# Patient Record
Sex: FEMALE | Race: WHITE | Employment: UNEMPLOYED | URBAN - METROPOLITAN AREA
[De-identification: names, ages, dates, MRNs, and addresses within clinical notes are randomized per-mention and may not be internally consistent; named-entity substitution may affect disease eponyms.]

---

## 2019-11-14 ENCOUNTER — OFFICE VISIT (OUTPATIENT)
Dept: CARDIOLOGY CLINIC | Age: 64
End: 2019-11-14

## 2019-11-14 VITALS
WEIGHT: 151.2 LBS | HEIGHT: 63 IN | BODY MASS INDEX: 26.79 KG/M2 | SYSTOLIC BLOOD PRESSURE: 151 MMHG | HEART RATE: 68 BPM | DIASTOLIC BLOOD PRESSURE: 94 MMHG

## 2019-11-14 DIAGNOSIS — E78.00 HYPERCHOLESTEREMIA: ICD-10-CM

## 2019-11-14 DIAGNOSIS — R55 NEAR SYNCOPE: Primary | ICD-10-CM

## 2019-11-14 DIAGNOSIS — R07.9 CHEST PAIN, UNSPECIFIED TYPE: ICD-10-CM

## 2019-11-14 DIAGNOSIS — R03.0 PREHYPERTENSION: ICD-10-CM

## 2019-11-14 RX ORDER — ASPIRIN 81 MG/1
81 TABLET ORAL DAILY
COMMUNITY

## 2019-11-14 RX ORDER — ATORVASTATIN CALCIUM 10 MG/1
10 TABLET, FILM COATED ORAL DAILY
COMMUNITY
End: 2022-09-27 | Stop reason: ALTCHOICE

## 2019-11-14 RX ORDER — GLUCOSAM/CHONDRO/HERB 149/HYAL 750-100 MG
1 TABLET ORAL DAILY
COMMUNITY

## 2019-11-14 RX ORDER — BISMUTH SUBSALICYLATE 262 MG
1 TABLET,CHEWABLE ORAL DAILY
COMMUNITY

## 2019-11-14 NOTE — PATIENT INSTRUCTIONS
There are no discontinued medications. After the recommended changes have been made in blood pressure medicines, patient advised to keep BP/HR(pulse rate) chart twice daily and bring us results in next 1-2 weeks or so. Patient may send the results via \"My Chart\" if desired. Please rest for 5-10 minutes before checking blood pressure       Dizziness: Care Instructions  Your Care Instructions  Dizziness is the feeling of unsteadiness or fuzziness in your head. It is different than having vertigo, which is a feeling that the room is spinning or that you are moving or falling. It is also different from lightheadedness, which is the feeling that you are about to faint. It can be hard to know what causes dizziness. Some people feel dizzy when they have migraine headaches. Sometimes bouts of flu can make you feel dizzy. Some medical conditions, such as heart problems or high blood pressure, can make you feel dizzy. Many medicines can cause dizziness, including medicines for high blood pressure, pain, or anxiety. If a medicine causes your symptoms, your doctor may recommend that you stop or change the medicine. If it is a problem with your heart, you may need medicine to help your heart work better. If there is no clear reason for your symptoms, your doctor may suggest watching and waiting for a while to see if the dizziness goes away on its own. Follow-up care is a key part of your treatment and safety. Be sure to make and go to all appointments, and call your doctor if you are having problems. It's also a good idea to know your test results and keep a list of the medicines you take. How can you care for yourself at home? · If your doctor recommends or prescribes medicine, take it exactly as directed. Call your doctor if you think you are having a problem with your medicine. · Do not drive while you feel dizzy. · Try to prevent falls. Steps you can take include:  ?  Using nonskid mats, adding grab bars near the tub, and using night-lights. ? Clearing your home so that walkways are free of anything you might trip on.  ? Letting family and friends know that you have been feeling dizzy. This will help them know how to help you. When should you call for help? Call 911 anytime you think you may need emergency care. For example, call if:    · You passed out (lost consciousness).     · You have dizziness along with symptoms of a heart attack. These may include:  ? Chest pain or pressure, or a strange feeling in the chest.  ? Sweating. ? Shortness of breath. ? Nausea or vomiting. ? Pain, pressure, or a strange feeling in the back, neck, jaw, or upper belly or in one or both shoulders or arms. ? Lightheadedness or sudden weakness. ? A fast or irregular heartbeat.     · You have symptoms of a stroke. These may include:  ? Sudden numbness, tingling, weakness, or loss of movement in your face, arm, or leg, especially on only one side of your body. ? Sudden vision changes. ? Sudden trouble speaking. ? Sudden confusion or trouble understanding simple statements. ? Sudden problems with walking or balance. ? A sudden, severe headache that is different from past headaches.    Call your doctor now or seek immediate medical care if:    · You feel dizzy and have a fever, headache, or ringing in your ears.     · You have new or increased nausea and vomiting.     · Your dizziness does not go away or comes back.    Watch closely for changes in your health, and be sure to contact your doctor if:    · You do not get better as expected. Where can you learn more? Go to http://cal-caity.info/. Enter G757 in the search box to learn more about \"Dizziness: Care Instructions. \"  Current as of: June 26, 2019  Content Version: 12.2  © 6834-7556 [employer]. Care instructions adapted under license by [employer] (which disclaims liability or warranty for this information).  If you have questions about a medical condition or this instruction, always ask your healthcare professional. [place] [address] any warranty or liability for your use of this information.

## 2019-11-14 NOTE — PROGRESS NOTES
HISTORY OF PRESENT ILLNESS  Willow Levi is a 60 y.o. female. 11/19 Initial episodes in winter 2016. Now recurring since 9/19. Episode starts with feeling bad followed by nausea and then she rests otherwise she thinks she will pass out. No sweating. No incontinence. Occasionally may feel flushed. She quit driving recently. 3-4 years ago approximately when she had these episodes she had blurred vision and at that time mostly episodes happened while driving. History of seizures in 2016 only once without any recurrence. Dizziness   The history is provided by the medical records and patient (see comments). Associated symptoms include chest pain. Pertinent negatives include no headaches and no shortness of breath. Chest Pain (Angina)    The history is provided by the patient. This is a new problem. The current episode started more than 1 week ago. Duration of episode(s) is 2 minutes. The pain is associated with normal activity and walking. The pain is present in the substernal region. The quality of the pain is described as sharp. The pain does not radiate. Associated symptoms include dizziness and palpitations. Pertinent negatives include no claudication, no cough, no diaphoresis, no fever, no headaches, no malaise/fatigue, no nausea, no orthopnea, no PND, no shortness of breath and no vomiting. She has tried nothing for the symptoms. Review of Systems   Constitutional: Negative for chills, diaphoresis, fever, malaise/fatigue and weight loss. HENT: Negative for nosebleeds. Eyes: Negative for discharge. Respiratory: Negative for cough, shortness of breath and wheezing. Cardiovascular: Positive for chest pain and palpitations. Negative for orthopnea, claudication, leg swelling and PND. Gastrointestinal: Negative for diarrhea, nausea and vomiting. Genitourinary: Negative for dysuria and hematuria. Musculoskeletal: Negative for joint pain. Skin: Negative for rash. Neurological: Positive for dizziness. Negative for seizures, loss of consciousness and headaches. Endo/Heme/Allergies: Negative for polydipsia. Does not bruise/bleed easily. Psychiatric/Behavioral: Negative for depression and substance abuse. The patient does not have insomnia. No Known Allergies    Past Medical History:   Diagnosis Date    Hyperlipidemia        History reviewed. No pertinent family history. Social History     Tobacco Use    Smoking status: Never Smoker    Smokeless tobacco: Never Used   Substance Use Topics    Alcohol use: Yes     Alcohol/week: 1.0 standard drinks     Types: 1 Glasses of wine per week     Frequency: Monthly or less     Drinks per session: 1 or 2     Binge frequency: Never    Drug use: Not on file        Current Outpatient Medications   Medication Sig    aspirin delayed-release 81 mg tablet Take 81 mg by mouth daily.  atorvastatin (LIPITOR) 10 mg tablet Take 10 mg by mouth daily.  multivitamin (ONE A DAY) tablet Take 1 Tab by mouth daily.  omega 3-DHA-EPA-fish oil 1,000 mg (120 mg-180 mg) capsule Take 1 Cap by mouth daily. No current facility-administered medications for this visit. History reviewed. No pertinent surgical history. Visit Vitals  BP (!) 143/104 (BP Patient Position: Standing)   Pulse 79   Ht 5' 3\" (1.6 m)   Wt 68.6 kg (151 lb 3.2 oz)   BMI 26.78 kg/m²     Vitals:    11/14/19 1149 11/14/19 1217 11/14/19 1221 11/14/19 1301   BP: 140/90 147/89 (!) 143/104 (!) 151/94   BP Patient Position:  Supine Standing    Pulse: 70 68 79 68   Weight: 68.6 kg (151 lb 3.2 oz)      Height: 5' 3\" (1.6 m)          Diagnostic Studies:  I have reviewed the relevant tests done on the patient and show as follows  EKG tracings reviewed by me today.     EKG Results     Procedure 720 Value Units Date/Time    AMB POC EKG ROUTINE W/ 12 LEADS, INTER & REP [[id number]] Resulted:  11/14/19 1217    Order Status:  Completed Updated:  11/14/19 1217        XR Results (most recent):  No results found for this or any previous visit. No flowsheet data found. Ms. Cuba Late has a reminder for a \"due or due soon\" health maintenance. I have asked that she contact her primary care provider for follow-up on this health maintenance. Physical Exam   Constitutional: She is oriented to person, place, and time. She appears well-developed and well-nourished. No distress. HENT:   Head: Normocephalic and atraumatic. Mouth/Throat: Normal dentition. Eyes: Right eye exhibits no discharge. Left eye exhibits no discharge. No scleral icterus. Neck: Neck supple. No JVD present. Carotid bruit is not present. No thyromegaly present. Cardiovascular: Normal rate, regular rhythm, S1 normal, S2 normal, normal heart sounds and intact distal pulses. Exam reveals no gallop and no friction rub. No murmur heard. Pulmonary/Chest: Effort normal and breath sounds normal. She has no wheezes. She has no rales. Abdominal: Soft. She exhibits no mass. There is no tenderness. Musculoskeletal: She exhibits no edema. Lymphadenopathy:        Right cervical: No superficial cervical adenopathy present. Left cervical: No superficial cervical adenopathy present. Neurological: She is alert and oriented to person, place, and time. Skin: Skin is warm and dry. No rash noted. Psychiatric: She has a normal mood and affect. Her behavior is normal.       ASSESSMENT and PLAN    History of near syncope. Will need work-up done with 2D echo and a tilt table test.  She has normal EKG and excellent functional capacity but considering a history of significant chest pain, will get a stress echo also. Patient had chest pain during the office visit. EKG repeated and no acute ST-T changes seen. Check home BP chart as she likely will need some medications for blood pressure is well. Diagnoses and all orders for this visit:    1.  Near syncope  -     AMB POC EKG ROUTINE W/ 12 LEADS, INTER & REP  -     ECHO ADULT COMPLETE; Future  -     TILT TABLE EVAL W/WO DRUG; Future  -     AMB POC EKG ROUTINE W/ 12 LEADS, INTER & REP    2. Prehypertension  -     [facility]    3. Hypercholesteremia    4. Chest pain, unspecified type  -     ECHO STRESS; Future  -     AMB POC EKG ROUTINE W/ 12 LEADS, INTER & REP        Pertinent laboratory and test data reviewed and discussed with patient. See patient instructions also for other medical advice given    There are no discontinued medications. Follow-up and Dispositions    · Return in about 1 month (around 12/14/2019), or if symptoms worsen or fail to improve, for post test, Get labs from PCP including lipids.

## 2019-11-14 NOTE — H&P (VIEW-ONLY)
HISTORY OF PRESENT ILLNESS Alessandro Deleon is a 60 y.o. female. 11/19 Initial episodes in winter 2016. Now recurring since 9/19. Episode starts with feeling bad followed by nausea and then she rests otherwise she thinks she will pass out. No sweating. No incontinence. Occasionally may feel flushed. She quit driving recently. 3-4 years ago approximately when she had these episodes she had blurred vision and at that time mostly episodes happened while driving. History of seizures in 2016 only once without any recurrence. Dizziness The history is provided by the medical records and patient (see comments). Associated symptoms include chest pain. Pertinent negatives include no headaches and no shortness of breath. Chest Pain (Angina) The history is provided by the patient. This is a new problem. The current episode started more than 1 week ago. Duration of episode(s) is 2 minutes. The pain is associated with normal activity and walking. The pain is present in the substernal region. The quality of the pain is described as sharp. The pain does not radiate. Associated symptoms include dizziness and palpitations. Pertinent negatives include no claudication, no cough, no diaphoresis, no fever, no headaches, no malaise/fatigue, no nausea, no orthopnea, no PND, no shortness of breath and no vomiting. She has tried nothing for the symptoms. Review of Systems Constitutional: Negative for chills, diaphoresis, fever, malaise/fatigue and weight loss. HENT: Negative for nosebleeds. Eyes: Negative for discharge. Respiratory: Negative for cough, shortness of breath and wheezing. Cardiovascular: Positive for chest pain and palpitations. Negative for orthopnea, claudication, leg swelling and PND. Gastrointestinal: Negative for diarrhea, nausea and vomiting. Genitourinary: Negative for dysuria and hematuria. Musculoskeletal: Negative for joint pain. Skin: Negative for rash. Neurological: Positive for dizziness. Negative for seizures, loss of consciousness and headaches. Endo/Heme/Allergies: Negative for polydipsia. Does not bruise/bleed easily. Psychiatric/Behavioral: Negative for depression and substance abuse. The patient does not have insomnia. No Known Allergies Past Medical History:  
Diagnosis Date  Hyperlipidemia History reviewed. No pertinent family history. Social History Tobacco Use  Smoking status: Never Smoker  Smokeless tobacco: Never Used Substance Use Topics  Alcohol use: Yes Alcohol/week: 1.0 standard drinks Types: 1 Glasses of wine per week Frequency: Monthly or less Drinks per session: 1 or 2 Binge frequency: Never  Drug use: Not on file Current Outpatient Medications Medication Sig  
 aspirin delayed-release 81 mg tablet Take 81 mg by mouth daily.  atorvastatin (LIPITOR) 10 mg tablet Take 10 mg by mouth daily.  multivitamin (ONE A DAY) tablet Take 1 Tab by mouth daily.  omega 3-DHA-EPA-fish oil 1,000 mg (120 mg-180 mg) capsule Take 1 Cap by mouth daily. No current facility-administered medications for this visit. History reviewed. No pertinent surgical history. Visit Vitals BP (!) 143/104 (BP Patient Position: Standing) Pulse 79 Ht 5' 3\" (1.6 m) Wt 68.6 kg (151 lb 3.2 oz) BMI 26.78 kg/m² Vitals:  
 11/14/19 1149 11/14/19 1217 11/14/19 1221 11/14/19 1301 BP: 140/90 147/89 (!) 143/104 (!) 151/94 BP Patient Position:  Supine Standing Pulse: 70 68 79 68 Weight: 68.6 kg (151 lb 3.2 oz) Height: 5' 3\" (1.6 m) Diagnostic Studies: 
I have reviewed the relevant tests done on the patient and show as follows EKG tracings reviewed by me today. EKG Results Procedure 720 Value Units Date/Time AMB POC EKG ROUTINE W/ 12 LEADS, INTER & REP [[id number]] Resulted:  11/14/19 1217 Order Status:  Completed Updated:  11/14/19 1217 XR Results (most recent): No results found for this or any previous visit. No flowsheet data found. Ms. Betsy Whipple has a reminder for a \"due or due soon\" health maintenance. I have asked that she contact her primary care provider for follow-up on this health maintenance. Physical Exam  
Constitutional: She is oriented to person, place, and time. She appears well-developed and well-nourished. No distress. HENT:  
Head: Normocephalic and atraumatic. Mouth/Throat: Normal dentition. Eyes: Right eye exhibits no discharge. Left eye exhibits no discharge. No scleral icterus. Neck: Neck supple. No JVD present. Carotid bruit is not present. No thyromegaly present. Cardiovascular: Normal rate, regular rhythm, S1 normal, S2 normal, normal heart sounds and intact distal pulses. Exam reveals no gallop and no friction rub. No murmur heard. Pulmonary/Chest: Effort normal and breath sounds normal. She has no wheezes. She has no rales. Abdominal: Soft. She exhibits no mass. There is no tenderness. Musculoskeletal: She exhibits no edema. Lymphadenopathy:  
     Right cervical: No superficial cervical adenopathy present. Left cervical: No superficial cervical adenopathy present. Neurological: She is alert and oriented to person, place, and time. Skin: Skin is warm and dry. No rash noted. Psychiatric: She has a normal mood and affect. Her behavior is normal.  
 
 
ASSESSMENT and PLAN History of near syncope. Will need work-up done with 2D echo and a tilt table test. 
She has normal EKG and excellent functional capacity but considering a history of significant chest pain, will get a stress echo also. Patient had chest pain during the office visit. EKG repeated and no acute ST-T changes seen. Check home BP chart as she likely will need some medications for blood pressure is well. Diagnoses and all orders for this visit: 1. Near syncope -     AMB POC EKG ROUTINE W/ 12 LEADS, INTER & REP 
-     ECHO ADULT COMPLETE; Future -     TILT TABLE EVAL W/WO DRUG; Future -     AMB POC EKG ROUTINE W/ 12 LEADS, INTER & REP 2. Prehypertension 
-     [facility] 3. Hypercholesteremia 4. Chest pain, unspecified type 
-     ECHO STRESS; Future -     AMB POC EKG ROUTINE W/ 12 LEADS, INTER & REP Pertinent laboratory and test data reviewed and discussed with patient. See patient instructions also for other medical advice given There are no discontinued medications. Follow-up and Dispositions · Return in about 1 month (around 12/14/2019), or if symptoms worsen or fail to improve, for post test, Get labs from PCP including lipids.

## 2019-11-19 ENCOUNTER — HOSPITAL ENCOUNTER (OUTPATIENT)
Dept: CARDIAC CATH/INVASIVE PROCEDURES | Age: 64
Discharge: HOME | End: 2019-11-19
Attending: INTERNAL MEDICINE | Admitting: INTERNAL MEDICINE
Payer: MEDICAID

## 2019-11-19 VITALS
HEIGHT: 63 IN | DIASTOLIC BLOOD PRESSURE: 73 MMHG | SYSTOLIC BLOOD PRESSURE: 130 MMHG | OXYGEN SATURATION: 100 % | RESPIRATION RATE: 16 BRPM | HEART RATE: 66 BPM | TEMPERATURE: 98.7 F | BODY MASS INDEX: 26.75 KG/M2 | WEIGHT: 151 LBS

## 2019-11-19 DIAGNOSIS — R55 NEAR SYNCOPE: ICD-10-CM

## 2019-11-19 PROCEDURE — 93660 TILT TABLE EVALUATION: CPT

## 2019-11-19 PROCEDURE — 74011250636 HC RX REV CODE- 250/636: Performed by: INTERNAL MEDICINE

## 2019-11-19 RX ORDER — SODIUM CHLORIDE 9 MG/ML
20 INJECTION, SOLUTION INTRAVENOUS CONTINUOUS
Status: DISCONTINUED | OUTPATIENT
Start: 2019-11-19 | End: 2019-11-19 | Stop reason: HOSPADM

## 2019-11-19 RX ADMIN — SODIUM CHLORIDE 20 ML/HR: 900 INJECTION, SOLUTION INTRAVENOUS at 12:38

## 2019-11-19 NOTE — PROGRESS NOTES
TRANSFER - OUT REPORT:    Verbal report given to AHMET Nielsen(name) on Foreman Si being transferred to [facility](unit) for routine progression of care       Report consisted of patient's Situation, Background, Assessment and   Recommendations(SBAR). Information from the following report(s) SBAR was reviewed with the receiving nurse. Opportunity for questions and clarification was provided.       Patient transported with:   [employer]

## 2019-11-19 NOTE — PERIOP NOTES
Pre-Op Summary    Pt arrived via car with family/friend and is oriented to time, place, person and situation. Patient with steady gait with none assistive devices. Visit Vitals  /72 (BP 1 Location: Left arm, BP Patient Position: At rest)   Pulse 70   Temp 98.7 °F (37.1 °C)   Resp 16   Ht 5' 3\" (1.6 m)   Wt 68.5 kg (151 lb)   SpO2 100%   BMI 26.75 kg/m²       Peripheral IV located on Left hand . Patients belongings are located with . Patient's point of contact is Sveta Aguayo  and their contact number is: [phone]. They will be in the waiting room. They are able to receive medication information. They will be their ride home.

## 2019-11-19 NOTE — INTERVAL H&P NOTE
H&P Update:  Jose Farfan was seen and examined. History and physical has been reviewed. Significant clinical changes have occurred as noted: Patient has eaten a full breakfast and the test will need to be rescheduled.

## 2019-11-19 NOTE — PERIOP NOTES
Pt reports no dizziness. Walked from stretcher to recliner with a steady gait. D/C instructions given .

## 2019-11-19 NOTE — PROGRESS NOTES
[address] [facility]  [address] [facility]       [phone]            Tilt TABLE REPORT       Number [id number]           Page 1 of 1  Patient Name: Ciara Meyer                                                                           Date: 2019  Room No: OP   Pt history/ Indications for test: Dizziness    MEDICATIONS: None       Vital Signs  TIME POSITION HR/RHYTHM BP  O2SAT %    1257 SUPINE 67//79 97   1302 SUPINE 65//77 98   1307 SUPINE 68//77 97   1312 60° HUT 74//84 96   1317 60° HUT 72//83 96   1322 60° HUT 86//81 95   1327 60° HUT 79//81 95   1332 60° HUT 76//78 97   1337 60° HUT 76//71 95   1342 60° HUT 80//82 96   1347 60° HUT 83//73 97   1352 SUPINE 68//71 95   1357 SUPINE 66//73 94                          Symptoms: 1322 patient stated she felt slightly nauseated. 1337 Patient states she is still feeling nauseous and has a slight headache. 1352 Patient stated that she no longer feels nauseated. I.V.  Fluids: Robb@Mdundo    Medications:      Complications:      Conclusion:        Staff Signature: Moraima Chicas MD Signature:_______________________________________ Carmen OLGUIN [phone]   1955

## 2019-11-19 NOTE — Clinical Note
Allergies reviewed, pre-procedure education provided, patient verbalized understanding of procedure, procedural consent signed and patient is NPO.

## 2019-12-18 NOTE — PROCEDURES
1.  Tilt table exam demonstrating no significant fall in blood pressure or heart rate. 2.  No loss of consciousness or inability to maintain posture. 3.  No evidence for progressive orthostatic hypotension    4.   No sustained heart rate of greater than 30 bpm or increased to 120 bpm within the first 10 minutes of the passive phase which would be diagnostic for POTs

## 2020-01-24 ENCOUNTER — OFFICE VISIT (OUTPATIENT)
Dept: CARDIOLOGY CLINIC | Age: 65
End: 2020-01-24

## 2020-01-24 VITALS
HEART RATE: 78 BPM | WEIGHT: 156 LBS | BODY MASS INDEX: 27.64 KG/M2 | SYSTOLIC BLOOD PRESSURE: 127 MMHG | HEIGHT: 63 IN | DIASTOLIC BLOOD PRESSURE: 72 MMHG

## 2020-01-24 DIAGNOSIS — R55 NEAR SYNCOPE: Primary | ICD-10-CM

## 2020-01-24 DIAGNOSIS — E78.00 HYPERCHOLESTEREMIA: ICD-10-CM

## 2020-01-24 DIAGNOSIS — R07.9 CHEST PAIN, UNSPECIFIED TYPE: ICD-10-CM

## 2020-01-24 NOTE — PATIENT INSTRUCTIONS
There are no discontinued medications. Orthostatic Hypotension: Care Instructions  Your Care Instructions    Orthostatic hypotension is a quick drop in blood pressure. It happens when you get up from sitting or lying down. You may feel faint, lightheaded, or dizzy. When a person sits up or stands up, the body changes the way it pumps blood. This can slow the flow of blood to the brain for a very short time. And that can make you feel lightheaded. Many medicines can cause this problem, especially in older people. Lack of fluids (dehydration) or illnesses such as diabetes or heart disease also can cause it. Follow-up care is a key part of your treatment and safety. Be sure to make and go to all appointments, and call your doctor if you are having problems. It's also a good idea to know your test results and keep a list of the medicines you take. How can you care for yourself at home? · Tell your doctor about any problems you have with your medicines. · If your doctor prescribes medicine to help prevent a low blood pressure problem, take it exactly as prescribed. Call your doctor if you think you are having a problem with your medicine. · Drink plenty of fluids, enough so that your urine is light yellow or clear like water. Choose water and other caffeine-free clear liquids. If you have kidney, heart, or liver disease and have to limit fluids, talk with your doctor before you increase the amount of fluids you drink. · Limit or avoid alcohol and caffeine. · Get up slowly from bed or after sitting for a long time. If you are in bed, roll to your side and swing your legs over the edge of the bed and onto the floor. Push your body up to a sitting position. Wait for a while before you slowly stand up. If you are dizzy or lightheaded, sit or lie down. When should you call for help? Call 911 anytime you think you may need emergency care. For example, call if:    · You passed out (lost consciousness).  Watch closely for changes in your health, and be sure to contact your doctor if:    · You do not get better as expected. Where can you learn more? Go to http://cal-caity.info/. Enter E294 in the search box to learn more about \"Orthostatic Hypotension: Care Instructions. \"  Current as of: 2019  Content Version: 12.2  © 6997-7161 [employer]. Care instructions adapted under license by [employer] (which disclaims liability or warranty for this information). If you have questions about a medical condition or this instruction, always ask your healthcare professional. [address] any warranty or liability for your use of this information. MyActivityPal Activation    Thank you for requesting access to MyActivityPal. Please follow the instructions below to securely access and download your online medical record. MyActivityPal allows you to send messages to your doctor, view your test results, renew your prescriptions, schedule appointments, and more. How Do I Sign Up? 1. In your internet browser, go to [email or web address]. 2. Click on the First Time User? Click Here link in the Sign In box. You will see the New Member Sign Up page. 3. Enter your MyActivityPal Access Code exactly as it appears below. You will not need to use this code after youve completed the sign-up process. If you do not sign up before the expiration date, you must request a new code. MyActivityPal Access Code: [email or web address]  Expires: 3/9/2020 11:22 AM (This is the date your MyActivityPal access code will )    4. Enter the last four digits of your Social Security Number (xxxx) and Date of Birth (mm/dd/yyyy) as indicated and click Submit. You will be taken to the next sign-up page. 5. Create a MyActivityPal ID. This will be your MyActivityPal login ID and cannot be changed, so think of one that is secure and easy to remember. 6. Create a MyActivityPal password.  You can change your password at any time. 7. Enter your Password Reset Question and Answer. This can be used at a later time if you forget your password. 8. Enter your e-mail address. You will receive e-mail notification when new information is available in [address] 9. Click Sign Up. You can now view and download portions of your medical record. 10. Click the Download Summary menu link to download a portable copy of your medical information. Additional Information    If you have questions, please visit the Frequently Asked Questions section of the [employer] website at [email or web address] Remember, [employer] is NOT to be used for urgent needs. For medical emergencies, dial 911.

## 2020-01-24 NOTE — PROGRESS NOTES
1. Have you been to the ER, urgent care clinic since your last visit? Hospitalized since your last visit?  no       2. Have you seen or consulted any other health care providers outside of the [address] [place] [address] since your last visit? Include any pap smears or colon screening. No     3. Since your last visit, have you had any of the following symptoms?      dizziness. 4.  Have you had any blood work, X-rays or cardiac testing? No         5. Where do you normally have your labs drawn? 6. Do you need any refills? no

## 2020-01-24 NOTE — PROGRESS NOTES
HISTORY OF PRESENT ILLNESS  Alba Westbrook is a 60 y.o. female. 11/19 Initial episodes in winter 2016. Now recurring since 9/19. Episode starts with feeling bad followed by nausea and then she rests otherwise she thinks she will pass out. No sweating. No incontinence. Occasionally may feel flushed. She quit driving recently. 3-4 years ago approximately when she had these episodes she had blurred vision and at that time mostly episodes happened while driving. History of seizures in 2016 only once without any recurrence. Dizziness   The history is provided by the medical records and patient (see comments). This is a recurrent problem. The current episode started more than 1 week ago. The problem occurs daily. The problem has not changed since onset. Associated symptoms include chest pain. Pertinent negatives include no headaches and no shortness of breath. Chest Pain (Angina)    The history is provided by the patient. This is a new problem. The current episode started more than 1 week ago. Duration of episode(s) is 2 minutes. The problem occurs every several days (3-4/wk). The pain is associated with normal activity and walking. The pain is present in the substernal region. The quality of the pain is described as sharp. The pain does not radiate. Associated symptoms include dizziness and palpitations. Pertinent negatives include no claudication, no cough, no diaphoresis, no fever, no headaches, no malaise/fatigue, no nausea, no orthopnea, no PND, no shortness of breath and no vomiting. She has tried nothing for the symptoms. Cholesterol Problem   Associated symptoms include chest pain. Pertinent negatives include no headaches and no shortness of breath. Review of Systems   Constitutional: Negative for chills, diaphoresis, fever, malaise/fatigue and weight loss. HENT: Negative for nosebleeds. Eyes: Negative for discharge.    Respiratory: Negative for cough, shortness of breath and wheezing. Cardiovascular: Positive for chest pain and palpitations. Negative for orthopnea, claudication, leg swelling and PND. Gastrointestinal: Negative for diarrhea, nausea and vomiting. Genitourinary: Negative for dysuria and hematuria. Musculoskeletal: Negative for joint pain. Skin: Negative for rash. Neurological: Positive for dizziness. Negative for seizures, loss of consciousness and headaches. Endo/Heme/Allergies: Negative for polydipsia. Does not bruise/bleed easily. Psychiatric/Behavioral: Negative for depression and substance abuse. The patient does not have insomnia. Allergies   Allergen Reactions    Benzoyl Peroxide Other (comments)     Eyes swell       Past Medical History:   Diagnosis Date    Hyperlipidemia        History reviewed. No pertinent family history. Social History     Tobacco Use    Smoking status: Never Smoker    Smokeless tobacco: Never Used   Substance Use Topics    Alcohol use: Yes     Alcohol/week: 1.0 standard drinks     Types: 1 Glasses of wine per week     Frequency: Monthly or less     Drinks per session: 1 or 2     Binge frequency: Never     Comment: occ.  Drug use: Never        Current Outpatient Medications   Medication Sig    aspirin delayed-release 81 mg tablet Take 81 mg by mouth daily.  atorvastatin (LIPITOR) 10 mg tablet Take 10 mg by mouth daily.  multivitamin (ONE A DAY) tablet Take 1 Tab by mouth daily.  omega 3-DHA-EPA-fish oil 1,000 mg (120 mg-180 mg) capsule Take 1 Cap by mouth daily. No current facility-administered medications for this visit. History reviewed. No pertinent surgical history.     Visit Vitals  /72   Pulse 78   Ht 5' 3\" (1.6 m)   Wt 70.8 kg (156 lb)   BMI 27.63 kg/m²     Vitals:    01/24/20 1054   BP: 127/72   Pulse: 78   Weight: 70.8 kg (156 lb)   Height: 5' 3\" (1.6 m)       Diagnostic Studies:  I have reviewed the relevant tests done on the patient and show as follows  EKG tracings reviewed by me today. EKG Results     None        XR Results (most recent):  No results found for this or any previous visit. 11/19 tilt table test  1. Tilt table exam demonstrating no significant fall in blood pressure or heart rate.     2. No loss of consciousness or inability to maintain posture.     3. No evidence for progressive orthostatic hypotension     4. No sustained heart rate of greater than 30 bpm or increased to 120 bpm within the first 10 minutes of the passive phase which would be diagnostic for POTs  12/12/19   ECHO STRESS 12/13/2019 12/16/2019    Narrative · Baseline ECG: Normal sinus rhythm. · Normal stress echocardiogram. Low risk study. · Low risk [facility] treadmill score. · Negative stress test.        Signed by: Tod Funk MD   12/19 echo  Interpretation Summary        · Left Ventricle: Normal cavity size, wall thickness and systolic function (ejection fraction normal). Estimated left ventricular ejection fraction is 56 - 60%. No regional wall motion abnormality noted. Mild (grade 1) left ventricular diastolic dysfunction. · Right Ventricle: Normal right ventricular size and function. · Mitral Valve: Trace mitral valve regurgitation is present. · Tricuspid Valve: Mild tricuspid valve regurgitation is present. · Pulmonary Artery: There is no evidence of pulmonary hypertension. Ms. Antonio Nguyen has a reminder for a \"due or due soon\" health maintenance. I have asked that she contact her primary care provider for follow-up on this health maintenance. Physical Exam   Constitutional: She is oriented to person, place, and time. She appears well-developed and well-nourished. No distress. HENT:   Head: Normocephalic and atraumatic. Mouth/Throat: Normal dentition. Eyes: Right eye exhibits no discharge. Left eye exhibits no discharge. No scleral icterus. Neck: Neck supple. No JVD present. Carotid bruit is not present. No thyromegaly present.    Cardiovascular: Normal rate, regular rhythm, S1 normal, S2 normal, normal heart sounds and intact distal pulses. Exam reveals no gallop and no friction rub. No murmur heard. Pulmonary/Chest: Effort normal and breath sounds normal. She has no wheezes. She has no rales. Abdominal: Soft. She exhibits no mass. There is no tenderness. Musculoskeletal:         General: No edema. Lymphadenopathy:        Right cervical: No superficial cervical adenopathy present. Left cervical: No superficial cervical adenopathy present. Neurological: She is alert and oriented to person, place, and time. Skin: Skin is warm and dry. No rash noted. Psychiatric: She has a normal mood and affect. Her behavior is normal.       ASSESSMENT and PLAN    Echo, stress echo and tilt table test are negative. During the tilt table, systolic pressure dropped by 30 points after about 20 minutes. Patient describes symptoms as soon as she was tilted upright which would not correlate with the drop in blood pressure. I do not think anything cardiac is going on with Ms. Payton Dandy but will follow her one more time.  describes change in mental status which may be due to seizures and I would leave that work-up and treatment to Dr. Darrell Schmitt. Diagnoses and all orders for this visit:    1. Near syncope    2. Chest pain, unspecified type    3. Hypercholesteremia        Pertinent laboratory and test data reviewed and discussed with patient. See patient instructions also for other medical advice given    There are no discontinued medications. Follow-up and Dispositions    · Return in about 6 months (around 7/24/2020), or if symptoms worsen or fail to improve.

## 2020-08-20 ENCOUNTER — OFFICE (OUTPATIENT)
Dept: URBAN - METROPOLITAN AREA CLINIC 14 | Facility: CLINIC | Age: 65
End: 2020-08-20

## 2020-08-20 VITALS
OXYGEN SATURATION: 90 % | HEIGHT: 67 IN | HEART RATE: 69 BPM | SYSTOLIC BLOOD PRESSURE: 136 MMHG | DIASTOLIC BLOOD PRESSURE: 75 MMHG | RESPIRATION RATE: 18 BRPM | WEIGHT: 293 LBS

## 2020-08-20 DIAGNOSIS — R93.5 ABNORMAL FINDINGS ON DIAGNOSTIC IMAGING OF OTHER ABDOMINAL R: ICD-10-CM

## 2020-08-20 PROCEDURE — 99204 OFFICE O/P NEW MOD 45 MIN: CPT | Performed by: NURSE PRACTITIONER

## 2020-08-20 RX ORDER — POLYETHYLENE GLYCOL 3350, SODIUM SULFATE, SODIUM CHLORIDE, POTASSIUM CHLORIDE, ASCORBIC ACID, SODIUM ASCORBATE 140-9-5.2G
KIT ORAL
Qty: 1 | Refills: 0 | Status: COMPLETED
Start: 2020-08-20 | End: 2020-09-01

## 2020-08-20 NOTE — SERVICEHPINOTES
Gaby Anand   is a   65   year old  female   here today at the request Dr. Urbina for evaluation of abnormal upper GI prior to bariatric surgery.  The patient plans to have a gastric bypass and as part of her preoperative evaluation her upper GI revealed a small hiatal hernia, reflux with some delayed clearance and possible nonspecific motility.  She has occasional heartburn after eating certain foods but denies reflux and does not require medications for this.  She denies dysphagia.  She has regular stools without recent change in her bowel habits, melena or hematochezia. There is no family history of colon cancer or polyps.  Her last colonoscopy was greater than 10 years ago.

## 2020-09-01 ENCOUNTER — OFFICE (OUTPATIENT)
Dept: URBAN - METROPOLITAN AREA PATHOLOGY 22 | Facility: PATHOLOGY | Age: 65
End: 2020-09-01

## 2020-09-01 ENCOUNTER — AMBULATORY SURGICAL CENTER (OUTPATIENT)
Dept: URBAN - METROPOLITAN AREA SURGERY 2 | Facility: SURGERY | Age: 65
End: 2020-09-01
Payer: MEDICARE

## 2020-09-01 ENCOUNTER — AMBULATORY SURGICAL CENTER (OUTPATIENT)
Dept: URBAN - METROPOLITAN AREA SURGERY 2 | Facility: SURGERY | Age: 65
End: 2020-09-01

## 2020-09-01 VITALS
OXYGEN SATURATION: 94 % | TEMPERATURE: 97.5 F | HEIGHT: 67 IN | SYSTOLIC BLOOD PRESSURE: 192 MMHG | OXYGEN SATURATION: 94 % | DIASTOLIC BLOOD PRESSURE: 62 MMHG | DIASTOLIC BLOOD PRESSURE: 60 MMHG | HEART RATE: 69 BPM | TEMPERATURE: 98 F | SYSTOLIC BLOOD PRESSURE: 116 MMHG | DIASTOLIC BLOOD PRESSURE: 60 MMHG | SYSTOLIC BLOOD PRESSURE: 134 MMHG | HEART RATE: 76 BPM | DIASTOLIC BLOOD PRESSURE: 96 MMHG | TEMPERATURE: 97.5 F | SYSTOLIC BLOOD PRESSURE: 126 MMHG | DIASTOLIC BLOOD PRESSURE: 96 MMHG | HEART RATE: 78 BPM | HEART RATE: 78 BPM | OXYGEN SATURATION: 100 % | DIASTOLIC BLOOD PRESSURE: 67 MMHG | DIASTOLIC BLOOD PRESSURE: 67 MMHG | RESPIRATION RATE: 16 BRPM | HEART RATE: 74 BPM | RESPIRATION RATE: 16 BRPM | HEART RATE: 69 BPM | DIASTOLIC BLOOD PRESSURE: 71 MMHG | SYSTOLIC BLOOD PRESSURE: 134 MMHG | HEART RATE: 72 BPM | DIASTOLIC BLOOD PRESSURE: 71 MMHG | TEMPERATURE: 98 F | HEART RATE: 72 BPM | HEART RATE: 74 BPM | DIASTOLIC BLOOD PRESSURE: 62 MMHG | SYSTOLIC BLOOD PRESSURE: 126 MMHG | TEMPERATURE: 98 F | SYSTOLIC BLOOD PRESSURE: 126 MMHG | HEART RATE: 76 BPM | HEART RATE: 74 BPM | DIASTOLIC BLOOD PRESSURE: 67 MMHG | SYSTOLIC BLOOD PRESSURE: 192 MMHG | HEIGHT: 67 IN | SYSTOLIC BLOOD PRESSURE: 140 MMHG | DIASTOLIC BLOOD PRESSURE: 71 MMHG | SYSTOLIC BLOOD PRESSURE: 116 MMHG | OXYGEN SATURATION: 100 % | HEART RATE: 72 BPM | SYSTOLIC BLOOD PRESSURE: 134 MMHG | SYSTOLIC BLOOD PRESSURE: 116 MMHG | SYSTOLIC BLOOD PRESSURE: 140 MMHG | SYSTOLIC BLOOD PRESSURE: 140 MMHG | HEIGHT: 67 IN | HEART RATE: 76 BPM | OXYGEN SATURATION: 94 % | RESPIRATION RATE: 16 BRPM | OXYGEN SATURATION: 100 % | DIASTOLIC BLOOD PRESSURE: 60 MMHG | SYSTOLIC BLOOD PRESSURE: 192 MMHG | HEART RATE: 69 BPM | HEART RATE: 78 BPM | TEMPERATURE: 97.5 F | DIASTOLIC BLOOD PRESSURE: 62 MMHG | DIASTOLIC BLOOD PRESSURE: 96 MMHG

## 2020-09-01 DIAGNOSIS — K31.89 OTHER DISEASES OF STOMACH AND DUODENUM: ICD-10-CM

## 2020-09-01 DIAGNOSIS — K44.9 DIAPHRAGMATIC HERNIA WITHOUT OBSTRUCTION OR GANGRENE: ICD-10-CM

## 2020-09-01 DIAGNOSIS — Z12.11 ENCOUNTER FOR SCREENING FOR MALIGNANT NEOPLASM OF COLON: ICD-10-CM

## 2020-09-01 PROCEDURE — G8918 PT W/O PREOP ORDER IV AB PRO: HCPCS | Performed by: INTERNAL MEDICINE

## 2020-09-01 PROCEDURE — 88342 IMHCHEM/IMCYTCHM 1ST ANTB: CPT | Performed by: INTERNAL MEDICINE

## 2020-09-01 PROCEDURE — 88313 SPECIAL STAINS GROUP 2: CPT | Performed by: INTERNAL MEDICINE

## 2020-09-01 PROCEDURE — 43239 EGD BIOPSY SINGLE/MULTIPLE: CPT | Mod: 51 | Performed by: INTERNAL MEDICINE

## 2020-09-01 PROCEDURE — G0121 COLON CA SCRN NOT HI RSK IND: HCPCS | Performed by: INTERNAL MEDICINE

## 2020-09-01 PROCEDURE — G8907 PT DOC NO EVENTS ON DISCHARG: HCPCS | Performed by: INTERNAL MEDICINE

## 2020-09-01 PROCEDURE — 88305 TISSUE EXAM BY PATHOLOGIST: CPT | Performed by: INTERNAL MEDICINE

## 2022-03-18 PROBLEM — E78.00 HYPERCHOLESTEREMIA: Status: ACTIVE | Noted: 2019-11-14

## 2022-03-19 PROBLEM — R07.9 CHEST PAIN: Status: ACTIVE | Noted: 2019-11-14

## 2022-03-19 PROBLEM — R55 NEAR SYNCOPE: Status: ACTIVE | Noted: 2019-11-14

## 2022-03-19 PROBLEM — R03.0 PREHYPERTENSION: Status: ACTIVE | Noted: 2019-11-14

## 2022-05-26 ENCOUNTER — TRANSCRIBE ORDER (OUTPATIENT)
Dept: REGISTRATION | Age: 67
End: 2022-05-26

## 2022-05-26 ENCOUNTER — HOSPITAL ENCOUNTER (OUTPATIENT)
Dept: GENERAL RADIOLOGY | Age: 67
Discharge: HOME | End: 2022-05-26
Payer: MEDICARE

## 2022-05-26 DIAGNOSIS — M25.512 LEFT SHOULDER PAIN: ICD-10-CM

## 2022-05-26 DIAGNOSIS — M25.512 LEFT SHOULDER PAIN: Primary | ICD-10-CM

## 2022-05-26 PROCEDURE — 73030 X-RAY EXAM OF SHOULDER: CPT

## 2022-08-11 ENCOUNTER — TRANSCRIBE ORDER (OUTPATIENT)
Dept: SCHEDULING | Age: 67
End: 2022-08-11

## 2022-08-11 DIAGNOSIS — M25.512 LEFT SHOULDER PAIN: Primary | ICD-10-CM

## 2022-09-13 ENCOUNTER — HOSPITAL ENCOUNTER (OUTPATIENT)
Dept: MRI IMAGING | Age: 67
Discharge: HOME | End: 2022-09-13
Payer: MEDICARE

## 2022-09-13 DIAGNOSIS — M25.512 LEFT SHOULDER PAIN: ICD-10-CM

## 2022-09-13 PROCEDURE — 73221 MRI JOINT UPR EXTREM W/O DYE: CPT

## 2022-09-27 ENCOUNTER — OFFICE VISIT (OUTPATIENT)
Dept: ORTHOPEDIC SURGERY | Age: 67
End: 2022-09-27
Payer: MEDICARE

## 2022-09-27 VITALS — HEIGHT: 62 IN | WEIGHT: 153 LBS | BODY MASS INDEX: 28.16 KG/M2

## 2022-09-27 DIAGNOSIS — M75.122 COMPLETE TEAR OF LEFT ROTATOR CUFF, UNSPECIFIED WHETHER TRAUMATIC: ICD-10-CM

## 2022-09-27 DIAGNOSIS — M25.512 LEFT SHOULDER PAIN, UNSPECIFIED CHRONICITY: Primary | ICD-10-CM

## 2022-09-27 DIAGNOSIS — M25.512 LEFT SHOULDER PAIN, UNSPECIFIED CHRONICITY: ICD-10-CM

## 2022-09-27 PROCEDURE — 1090F PRES/ABSN URINE INCON ASSESS: CPT | Performed by: ORTHOPAEDIC SURGERY

## 2022-09-27 PROCEDURE — G8417 CALC BMI ABV UP PARAM F/U: HCPCS | Performed by: ORTHOPAEDIC SURGERY

## 2022-09-27 PROCEDURE — 1101F PT FALLS ASSESS-DOCD LE1/YR: CPT | Performed by: ORTHOPAEDIC SURGERY

## 2022-09-27 PROCEDURE — 1123F ACP DISCUSS/DSCN MKR DOCD: CPT | Performed by: ORTHOPAEDIC SURGERY

## 2022-09-27 PROCEDURE — G8400 PT W/DXA NO RESULTS DOC: HCPCS | Performed by: ORTHOPAEDIC SURGERY

## 2022-09-27 PROCEDURE — G8432 DEP SCR NOT DOC, RNG: HCPCS | Performed by: ORTHOPAEDIC SURGERY

## 2022-09-27 PROCEDURE — G8427 DOCREV CUR MEDS BY ELIG CLIN: HCPCS | Performed by: ORTHOPAEDIC SURGERY

## 2022-09-27 PROCEDURE — G8536 NO DOC ELDER MAL SCRN: HCPCS | Performed by: ORTHOPAEDIC SURGERY

## 2022-09-27 PROCEDURE — 3017F COLORECTAL CA SCREEN DOC REV: CPT | Performed by: ORTHOPAEDIC SURGERY

## 2022-09-27 PROCEDURE — 99204 OFFICE O/P NEW MOD 45 MIN: CPT | Performed by: ORTHOPAEDIC SURGERY

## 2022-09-27 NOTE — PATIENT INSTRUCTIONS
Rotator Cuff Injury: Care Instructions  Overview     The rotator cuff is a group of tendons and muscles around the shoulder that keeps the upper arm bone in place. It keeps the shoulder joint stable and allows you to raise and rotate your arm. Damage to the rotator cuff can be caused by overuse, a fall, or a direct blow to the shoulder area, which can tear the tendons. Over time, everyday wear can damage the tendons and make injury more likely. Treatment can depend on the type and amount of damage to the tendons. Your doctor may have you try physical therapy and exercise first. If physical therapy does not help, your doctor may recommend surgery. Follow-up care is a key part of your treatment and safety. Be sure to make and go to all appointments, and call your doctor if you are having problems. It's also a good idea to know your test results and keep a list of the medicines you take. How can you care for yourself at home? Rest your shoulder as much as you can. If your doctor put your arm in a sling or shoulder immobilizer, wear it as directed. Do not take it off before your doctor tells you to. If it is too tight, loosen it. Be safe with medicines. Read and follow all instructions on the label. If the doctor gave you a prescription medicine for pain, take it as prescribed. If you are not taking a prescription pain medicine, ask your doctor if you can take an over-the-counter medicine. Put ice or a cold pack on your shoulder for 10 to 20 minutes at a time. Try to do this every 1 to 2 hours for the next 3 days (when you are awake). Put a thin cloth between the ice pack and your skin. After 2 or 3 days, if you don't have swelling, apply heat. Put a warm water bottle, a heating pad set on low, or a warm cloth on your shoulder. Do not go to sleep with a heating pad on your skin. Put a thin cloth between the heating pad and your skin.   While holding a warm, wet towel on your shoulder, lean forward so your arm hangs freely, and gently swing your arm back and forth like a pendulum. You also can do this standing under a warm shower. Do not do anything that makes your pain worse. Follow your doctor's advice about whether you need physical therapy. When should you call for help? Call your doctor now or seek immediate medical care if:    You have severe pain. You cannot move your shoulder or arm. You have tingling or numbness in your arm or hand. Your arm or hand is cool or pale. Watch closely for changes in your health, and be sure to contact your doctor if:    Your pain gets worse. You have new or worse swelling in your arm or hand. You do not get better as expected. Where can you learn more? Go to http://www.sanchez.com/  Enter D027 in the search box to learn more about \"Rotator Cuff Injury: Care Instructions. \"  Current as of: July 1, 2021               Content Version: 13.2  © 8549-2507 [employer]. Care instructions adapted under license by [employer] (which disclaims liability or warranty for this information). If you have questions about a medical condition or this instruction, always ask your healthcare professional. [place] [address] any warranty or liability for your use of this information.

## 2022-09-27 NOTE — PROGRESS NOTES
Name: Sabrina Teresa    : 1955     Service Dept: [facility]    Chief Complaint   Patient presents with    Shoulder Pain        Visit Vitals  Ht 5' 2\" (1.575 m)   Wt 153 lb (69.4 kg)   BMI 27.98 kg/m²        Allergies   Allergen Reactions    Benzoyl Peroxide Other (comments)     Eyes swell        Current Outpatient Medications   Medication Sig Dispense Refill    aspirin delayed-release 81 mg tablet Take 81 mg by mouth daily. multivitamin (ONE A DAY) tablet Take 1 Tab by mouth daily. omega 3-DHA-EPA-fish oil 1,000 mg (120 mg-180 mg) capsule Take 1 Cap by mouth daily. Patient Active Problem List   Diagnosis Code    Prehypertension R03.0    Near syncope R55    Hypercholesteremia E78.00    Chest pain R07.9      Family History   Problem Relation Age of Onset    No Known Problems Mother     No Known Problems Father       Social History     Socioeconomic History    Marital status:    Tobacco Use    Smoking status: Never    Smokeless tobacco: Never   Substance and Sexual Activity    Alcohol use: Yes     Alcohol/week: 1.0 standard drink     Types: 1 Glasses of wine per week     Comment: occ. Drug use: Never      History reviewed. No pertinent surgical history. Past Medical History:   Diagnosis Date    Hyperlipidemia         I have reviewed and agree with [address] [place] [address] [place] and ROS and intake form in chart and the record furthermore I have reviewed prior medical record(s) regarding this patients care during this appointment. Review of Systems:   Patient is a pleasant appearing individual, appropriately dressed, well hydrated, well nourished, who is alert, appropriately oriented for age, and in no acute distress with a normal gait and normal affect who does not appear to be in any significant pain. Physical Exam:  Left Shoulder - Positive \"empty can\" test, Grossly neurovascularly intact. Range of motion-Full passive, Active with impingement.  No Point tenderness, Strength-significant weakness noted with abduction, some mild crepitation, No skin lesion are identified, No instabilty is noted, No apprehension. No Swelling. Right Shoulder - grossly neurovascularly intact. Full Range of motion, No Weakness with abduction, No Point Tenderness, No skin lesion are identified, No instabilty is noted, No apprehension. No cuts or abrasions are identified. Encounter Diagnoses     ICD-10-CM ICD-9-CM   1. Left shoulder pain, unspecified chronicity  M25.512 719.41   2. Complete tear of left rotator cuff, unspecified whether traumatic  M75.122 727.61       HPI:  The patient is here with a chief complaint of left shoulder pain, dull throbbing pain. It has been the same. Pain is 5/10. X-rays of the left shoulder are unremarkable. MRI is positive for full-thickness rotator cuff tear. Assessment/Plan:  Plan would be for left rotator cuff arthroscopy, rotator cuff repair, decompression, [place], extensive debridement, and suprascapular nerve block. We are going to proceed once the patient gets set up and go from there. As part of continued conservative pain management options the patient was advised to utilize Tylenol or OTC NSAIDS as long as it is not medically contraindicated. Return to Office: Follow-up and Dispositions    Return for schedule for surgery. Scribed by Bard Arsen LPN as dictated by [facility] JUANA Rowe MD.  Documentation True and Accepted Fahad Rowe MD

## 2022-09-27 NOTE — LETTER
9/28/2022    Patient: Krishna Clark   YOB: 1955   Date of Visit: 9/27/2022     Olivier Li NP  [facility] [address]  Via Fax: [phone]    Dear Olivier Li NP,      Thank you for referring Ms. Tiffany Hernández to [address] [place] [address] for evaluation. My notes for this consultation are attached. If you have questions, please do not hesitate to call me. I look forward to following your patient along with you.       Sincerely,    Tami Dill MD

## 2022-10-24 ENCOUNTER — HOSPITAL ENCOUNTER (OUTPATIENT)
Dept: NON INVASIVE DIAGNOSTICS | Age: 67
Discharge: HOME | End: 2022-10-24
Payer: MEDICARE

## 2022-10-24 ENCOUNTER — HOSPITAL ENCOUNTER (OUTPATIENT)
Dept: LAB | Age: 67
Discharge: HOME | End: 2022-10-24
Payer: MEDICARE

## 2022-10-24 ENCOUNTER — HOSPITAL ENCOUNTER (OUTPATIENT)
Dept: GENERAL RADIOLOGY | Age: 67
Discharge: HOME | End: 2022-10-24
Payer: MEDICARE

## 2022-10-24 DIAGNOSIS — M25.512 LEFT SHOULDER PAIN, UNSPECIFIED CHRONICITY: ICD-10-CM

## 2022-10-24 DIAGNOSIS — M75.122 COMPLETE TEAR OF LEFT ROTATOR CUFF, UNSPECIFIED WHETHER TRAUMATIC: ICD-10-CM

## 2022-10-24 LAB
ATRIAL RATE: 72 BPM
CALCULATED P AXIS, ECG09: 66 DEGREES
CALCULATED R AXIS, ECG10: 60 DEGREES
CALCULATED T AXIS, ECG11: 55 DEGREES
DIAGNOSIS, 93000: NORMAL
P-R INTERVAL, ECG05: 134 MS
Q-T INTERVAL, ECG07: 390 MS
QRS DURATION, ECG06: 74 MS
QTC CALCULATION (BEZET), ECG08: 427 MS
VENTRICULAR RATE, ECG03: 72 BPM

## 2022-10-24 PROCEDURE — 93005 ELECTROCARDIOGRAM TRACING: CPT

## 2022-10-24 PROCEDURE — 71046 X-RAY EXAM CHEST 2 VIEWS: CPT

## 2022-10-26 LAB — CREATININE, EXTERNAL: 0.82

## 2023-01-10 ENCOUNTER — TRANSCRIBE ORDER (OUTPATIENT)
Dept: SCHEDULING | Age: 68
End: 2023-01-10

## 2023-01-10 DIAGNOSIS — S09.93XA HEAD, FACE & NECK INJURY: Primary | ICD-10-CM

## 2023-01-10 DIAGNOSIS — S19.9XXA HEAD, FACE & NECK INJURY: Primary | ICD-10-CM

## 2023-01-10 DIAGNOSIS — S09.90XA HEAD INJURY: Primary | ICD-10-CM

## 2023-01-10 DIAGNOSIS — S09.90XA HEAD, FACE & NECK INJURY: Primary | ICD-10-CM

## 2023-01-11 ENCOUNTER — HOSPITAL ENCOUNTER (EMERGENCY)
Age: 68
Discharge: STILL ADMITTED OR ADMINISTRATIVE | End: 2023-01-11

## 2023-01-11 ENCOUNTER — HOSPITAL ENCOUNTER (OUTPATIENT)
Dept: CT IMAGING | Age: 68
Discharge: HOME | End: 2023-01-11
Payer: MEDICARE

## 2023-01-11 DIAGNOSIS — S09.90XA HEAD INJURY: ICD-10-CM

## 2023-01-11 PROCEDURE — 74011000636 HC RX REV CODE- 636

## 2023-01-11 PROCEDURE — 70470 CT HEAD/BRAIN W/O & W/DYE: CPT

## 2023-01-11 RX ADMIN — IOPAMIDOL 100 ML: 755 INJECTION, SOLUTION INTRAVENOUS at 11:46
